# Patient Record
Sex: MALE | Race: WHITE | Employment: FULL TIME | URBAN - METROPOLITAN AREA
[De-identification: names, ages, dates, MRNs, and addresses within clinical notes are randomized per-mention and may not be internally consistent; named-entity substitution may affect disease eponyms.]

---

## 2020-01-23 ENCOUNTER — OFFICE VISIT (OUTPATIENT)
Dept: FAMILY MEDICINE CLINIC | Age: 35
End: 2020-01-23
Payer: COMMERCIAL

## 2020-01-23 VITALS
TEMPERATURE: 98.8 F | BODY MASS INDEX: 33.99 KG/M2 | WEIGHT: 217 LBS | SYSTOLIC BLOOD PRESSURE: 125 MMHG | OXYGEN SATURATION: 98 % | DIASTOLIC BLOOD PRESSURE: 82 MMHG | HEART RATE: 101 BPM

## 2020-01-23 LAB — S PYO AG THROAT QL: NORMAL

## 2020-01-23 PROCEDURE — 99213 OFFICE O/P EST LOW 20 MIN: CPT | Performed by: FAMILY MEDICINE

## 2020-01-23 PROCEDURE — 87880 STREP A ASSAY W/OPTIC: CPT | Performed by: FAMILY MEDICINE

## 2020-01-23 RX ORDER — ALBUTEROL SULFATE 90 UG/1
2 AEROSOL, METERED RESPIRATORY (INHALATION) EVERY 6 HOURS PRN
Qty: 1 INHALER | Refills: 0 | Status: SHIPPED | OUTPATIENT
Start: 2020-01-23

## 2020-01-23 RX ORDER — AZITHROMYCIN 250 MG/1
TABLET, FILM COATED ORAL
Qty: 1 PACKET | Refills: 0 | Status: SHIPPED | OUTPATIENT
Start: 2020-01-23 | End: 2020-02-02

## 2020-01-23 RX ORDER — BENZONATATE 100 MG/1
100 CAPSULE ORAL 3 TIMES DAILY PRN
Qty: 25 CAPSULE | Refills: 0 | Status: SHIPPED | OUTPATIENT
Start: 2020-01-23 | End: 2020-02-17

## 2020-01-23 RX ORDER — PREDNISONE 20 MG/1
20 TABLET ORAL 2 TIMES DAILY
Qty: 10 TABLET | Refills: 0 | Status: SHIPPED | OUTPATIENT
Start: 2020-01-23 | End: 2020-01-28

## 2020-01-23 ASSESSMENT — ENCOUNTER SYMPTOMS
CHEST TIGHTNESS: 1
DIARRHEA: 0
COUGH: 1
SHORTNESS OF BREATH: 0
SORE THROAT: 1
VOMITING: 0
EYE REDNESS: 0
RHINORRHEA: 1
PHOTOPHOBIA: 1
EYE PAIN: 0
WHEEZING: 1
EYE DISCHARGE: 0
SINUS PRESSURE: 1
BACK PAIN: 0
NAUSEA: 0
HEMOPTYSIS: 0
SINUS PAIN: 1
ABDOMINAL PAIN: 1

## 2020-01-23 NOTE — LETTER
[facility]   Emmalena Clayde Alina Jorge  Phone: [phone]  Fax: [phone]    Saravanan Sr MD        January 23, 2020     Patient: Peña Escalante   YOB: 1985   Date of Visit: 1/23/2020       To Whom it May Concern: Gabbi Foss was seen in my clinic on 1/23/2020. He should be excused 1/21/20 and 1/23/20. If you have any questions or concerns, please don't hesitate to call.     Sincerely,         Saravanan Sr MD

## 2020-01-23 NOTE — PATIENT INSTRUCTIONS
Patient Education        Bronchitis: Care Instructions  Your Care Instructions    Bronchitis is inflammation of the bronchial tubes, which carry air to the lungs. The tubes swell and produce mucus, or phlegm. The mucus and inflamed bronchial tubes make you cough. You may have trouble breathing. Most cases of bronchitis are caused by viruses like those that cause colds. Antibiotics usually do not help and they may be harmful. Bronchitis usually develops rapidly and lasts about 2 to 3 weeks in otherwise healthy people. Follow-up care is a key part of your treatment and safety. Be sure to make and go to all appointments, and call your doctor if you are having problems. It's also a good idea to know your test results and keep a list of the medicines you take. How can you care for yourself at home? · Take all medicines exactly as prescribed. Call your doctor if you think you are having a problem with your medicine. · Get some extra rest.  · Take an over-the-counter pain medicine, such as acetaminophen (Tylenol), ibuprofen (Advil, Motrin), or naproxen (Aleve) to reduce fever and relieve body aches. Read and follow all instructions on the label. · Do not take two or more pain medicines at the same time unless the doctor told you to. Many pain medicines have acetaminophen, which is Tylenol. Too much acetaminophen (Tylenol) can be harmful. · Take an over-the-counter cough medicine that contains dextromethorphan to help quiet a dry, hacking cough so that you can sleep. Avoid cough medicines that have more than one active ingredient. Read and follow all instructions on the label. · Breathe moist air from a humidifier, hot shower, or sink filled with hot water. The heat and moisture will thin mucus so you can cough it out. · Do not smoke. Smoking can make bronchitis worse. If you need help quitting, talk to your doctor about stop-smoking programs and medicines.  These can increase your chances of quitting for know your test results and keep a list of the medicines you take. How can you care for yourself at home? · You can buy premixed saline solution in a squeeze bottle or other sinus rinse products at a drugstore. Read and follow the instructions on the label. · You also can make your own saline solution by adding 1 teaspoon of salt and 1 teaspoon of baking soda to 2 cups of distilled water. · If you use a homemade solution, pour a small amount into a clean bowl. Using a rubber bulb syringe, squeeze the syringe and place the tip in the salt water. Pull a small amount of the salt water into the syringe by relaxing your hand. · Sit down with your head tilted slightly back. Do not lie down. Put the tip of the bulb syringe or the squeeze bottle a little way into one of your nostrils. Gently drip or squirt a few drops into the nostril. Repeat with the other nostril. Some sneezing and gagging are normal at first.  · Gently blow your nose. · Wipe the syringe or bottle tip clean after each use. · Repeat this 2 or 3 times a day. · Use nasal washes gently if you have nosebleeds often. When should you call for help? Watch closely for changes in your health, and be sure to contact your doctor if:    · You often get nosebleeds.     · You have problems doing the nasal washes. Where can you learn more? Go to https://[employer]. org and sign in to your [employer] account. Enter [phone] in the [facility] box to learn more about \"Saline Nasal Washes: Care Instructions. \"     If you do not have an account, please click on the \"Sign Up Now\" link. Current as of: July 28, 2019  Content Version: 12.3  © 0496-4724 Healthwise, Incorporated. Care instructions adapted under license by [facility] [employer] [facility] ([facility]). If you have questions about a medical condition or this instruction, always ask your healthcare professional. [address]  any warranty or liability for your use of this information. Patient Education        Upper Respiratory Infection (Cold): Care Instructions  Your Care Instructions    An upper respiratory infection, or URI, is an infection of the nose, sinuses, or throat. URIs are spread by coughs, sneezes, and direct contact. The common cold is the most frequent kind of URI. The flu and sinus infections are other kinds of URIs. Almost all URIs are caused by viruses. Antibiotics won't cure them. But you can treat most infections with home care. This may include drinking lots of fluids and taking over-the-counter pain medicine. You will probably feel better in 4 to 10 days. The doctor has checked you carefully, but problems can develop later. If you notice any problems or new symptoms, get medical treatment right away. Follow-up care is a key part of your treatment and safety. Be sure to make and go to all appointments, and call your doctor if you are having problems. It's also a good idea to know your test results and keep a list of the medicines you take. How can you care for yourself at home? · To prevent dehydration, drink plenty of fluids, enough so that your urine is light yellow or clear like water. Choose water and other caffeine-free clear liquids until you feel better. If you have kidney, heart, or liver disease and have to limit fluids, talk with your doctor before you increase the amount of fluids you drink. · Take an over-the-counter pain medicine, such as acetaminophen (Tylenol), ibuprofen (Advil, Motrin), or naproxen (Aleve). Read and follow all instructions on the label. · Before you use cough and cold medicines, check the label. These medicines may not be safe for young children or for people with certain health problems. · Be careful when taking over-the-counter cold or flu medicines and Tylenol at the same time. Many of these medicines have acetaminophen, which is Tylenol. Read the labels to make sure that you are not taking more than the recommended dose.  Too

## 2020-01-23 NOTE — PROGRESS NOTES
[place] [address] [facility]   [address] [address]  Dept: [phone]  Dept Fax: [phone]    Nakul Woodard is a 29 y.o. male who presents today for his medicalconditions/complaints as noted below. Nakul Woodard is c/o of Cough (deep cough x 4 days; starting to become productive cough; sore throat; OTC mucinex )        HPI:      Cough   This is a new problem. The current episode started in the past 7 days (4 days). The problem has been unchanged. The cough is productive of sputum. Associated symptoms include chills, ear congestion, ear pain, headaches, nasal congestion, postnasal drip, rhinorrhea, a sore throat and wheezing. Pertinent negatives include no chest pain, eye redness, fever, hemoptysis, myalgias, rash, shortness of breath or sweats. The symptoms are aggravated by cold air and lying down. He has tried OTC cough suppressant (ibuprofen) for the symptoms. The treatment provided mild relief. His past medical history is significant for asthma, bronchitis, environmental allergies and pneumonia. History reviewed. No pertinent past medical history. History reviewed. No pertinent surgical history.     Family History   Problem Relation Age of Onset    High Cholesterol Mother     High Cholesterol Father     Kidney Disease Maternal Grandmother     Diabetes Maternal Grandfather        Social History     Tobacco Use    Smoking status: Never Smoker    Smokeless tobacco: Never Used   Substance Use Topics    Alcohol use: Yes     Comment: occasionally      Current Outpatient Medications   Medication Sig Dispense Refill    benzonatate (TESSALON PERLES) 100 MG capsule Take 1 capsule by mouth 3 times daily as needed for Cough 25 capsule 0    predniSONE (DELTASONE) 20 MG tablet Take 1 tablet by mouth 2 times daily for 5 days Take one tablet twice a day with food for 5 days 10 tablet 0    albuterol sulfate  (90 Base) MCG/ACT mucosal edema, congestion and rhinorrhea present. Mouth/Throat:      Pharynx: Oropharyngeal exudate and posterior oropharyngeal erythema present. No pharyngeal swelling. Tonsils: No tonsillar exudate. Eyes:      General:         Right eye: No discharge. Left eye: No discharge. Conjunctiva/sclera: Conjunctivae normal.      Pupils: Pupils are equal, round, and reactive to light. Neck:      Musculoskeletal: Normal range of motion and neck supple. Cardiovascular:      Rate and Rhythm: Normal rate and regular rhythm. Heart sounds: Normal heart sounds. No murmur. Pulmonary:      Effort: Pulmonary effort is normal. No respiratory distress. Breath sounds: Wheezing present. No rales. Abdominal:      General: Bowel sounds are normal. There is no distension. Palpations: Abdomen is soft. There is no mass. Tenderness: There is no tenderness. Musculoskeletal: Normal range of motion. General: No tenderness. Lymphadenopathy:      Cervical: Cervical adenopathy present. Skin:     General: Skin is warm. Findings: No rash. Neurological:      Mental Status: He is alert and oriented to person, place, and time. Cranial Nerves: No cranial nerve deficit. Coordination: Coordination normal.   Psychiatric:         Behavior: Behavior normal.         Thought Content: Thought content normal.         Judgment: Judgment normal.       /82 (Site: Left Upper Arm, Position: Sitting, Cuff Size: Large Adult)   Pulse 101   Temp 98.8 °F (37.1 °C) (Oral)   Wt 217 lb (98.4 kg)   SpO2 98%   BMI 33.99 kg/m²     Assessment:       Diagnosis Orders   1. Acute bronchitis, unspecified organism  POCT rapid strep A    benzonatate (TESSALON PERLES) 100 MG capsule    predniSONE (DELTASONE) 20 MG tablet    albuterol sulfate  (90 Base) MCG/ACT inhaler    azithromycin (ZITHROMAX) 250 MG tablet   2.  Acute URI  POCT rapid strep A       Plan:      POCT: Strep test

## 2020-01-23 NOTE — LETTER
[facility]   [facility]  Phone: [phone]  Fax: [phone]    Margie Mancilla MD        January 23, 2020     Patient: Everett Escalante   YOB: 1985   Date of Visit: 1/23/2020       To Whom it May Concern: Karan Rodriguez was seen in my clinic on 1/23/2020. He should be excused from work 1/22/20 and 1/23/20. If you have any questions or concerns, please don't hesitate to call.     Sincerely,         Margie Mancilla MD

## 2020-02-10 ENCOUNTER — OFFICE VISIT (OUTPATIENT)
Dept: ORTHOPEDIC SURGERY | Age: 35
End: 2020-02-10
Payer: COMMERCIAL

## 2020-02-10 VITALS
HEART RATE: 86 BPM | BODY MASS INDEX: 32.58 KG/M2 | SYSTOLIC BLOOD PRESSURE: 138 MMHG | HEIGHT: 68 IN | DIASTOLIC BLOOD PRESSURE: 85 MMHG | WEIGHT: 215 LBS

## 2020-02-10 PROCEDURE — 99204 OFFICE O/P NEW MOD 45 MIN: CPT | Performed by: ORTHOPAEDIC SURGERY

## 2020-02-10 NOTE — PROGRESS NOTES
Disp: 25 capsule, Rfl: 0    albuterol sulfate  (90 Base) MCG/ACT inhaler, Inhale 2 puffs into the lungs every 6 hours as needed for Wheezing, Disp: 1 Inhaler, Rfl: 0    fluticasone (FLONASE) 50 MCG/ACT nasal spray, 2 sprays by Nasal route daily (Patient not taking: Reported on 1/23/2020), Disp: 1 Bottle, Rfl: 2     Allergies:    Patient has no known allergies. Family History:  family history includes Diabetes in his maternal grandfather; High Cholesterol in his father and mother; Kidney Disease in his maternal grandmother. Social History:   Social History     Occupational History    Not on file   Tobacco Use    Smoking status: Never Smoker    Smokeless tobacco: Never Used   Substance and Sexual Activity    Alcohol use: Yes     Comment: occasionally    Drug use: No    Sexual activity: Not on file     Occupation: Works full-time in a [employer]     OBJECTIVE:  /85   Pulse 86   Ht 5' 8\" (1.727 m)   Wt 215 lb (97.5 kg)   BMI 32.69 kg/m²    Psych: alert and oriented to person, time, and place  Cardio:  well perfused extremities  Resp:  normal respiratory effort  Skin:  no cyanosis  Hem/lymph:  no lymphedema  Neuro:  sensation to light touch grossly intact throughout all nerve distributions in the foot   Musculoskeletal:    RLE:  Alignment:  cavovarus, slightly asymmetric to contralateral side  Vascular: Toes warm and well perfused, compartments soft/compressible. No significant swelling of foot. Skin: Intact without rash/lesions/AV malformations.   Strength: Able to fire/perform the following with appropriate strength:    [x]  Tib Ant:     [x]  Gastroc-Soleus:         [x]  Inversion:    [x]  Eversion:         [x]  FHL:     [x]  EHL:      Motion:  Normal for the following joints:    []  Ankle: Decreased     []  Subtalar: Decreased      [x]  1st MTP:        []  1st TMT:            Tenderness to Palpation:    Tenderness to palpation:  anterior ankle joint line greater than sinus Tarsi  -Pain with maximum dorsiflexion  -Gastroc equinus  -Positive talar tilt compared to contralateral side      LLE:  Alignment:  Heel neutral subtle cavus  Vascular: Toes warm and well perfused, compartments soft/compressible. No significant swelling of foot. Skin: Intact without rash/lesions/AV malformations. Strength: Able to fire/perform the following with appropriate strength:    [x]  Tib Ant:     [x]  Gastroc-Soleus:         [x]  Inversion:    [x]  Eversion:         [x]  FHL:     [x]  EHL:      Motion:  Normal for the following joints:    []  Ankle: Decreased    [x]  Subtalar:        [x]  1st MTP:        []  1st TMT:            Tenderness to Palpation:    Tenderness to palpation:  anterior ankle joint line  -Gastroc equinus      RADIOLOGY:   2/10/2020 FINDINGS:  Three weightbearing views (AP, Mortise, and Lateral) of the bilateral ankle and three weightbearing views (AP, Oblique, Lateral) of the bilateral foot were obtained in the office today and reviewed, revealing no acute fracture, dislocation, or radioopaque foreign body/tumor. Degenerative changes of bilateral tibiotalar joints with anterior distal tibia and dorsal talar neck osteophytes, with preserved joint space. Bilaterally there are medial talar osteochondral defects noted. On the right, there is varus tilt of his tibiotalar joint, and only slight varus tilt on the left of the tibiotalar joint. IMPRESSION:  No acute fracture/dislocation. Degenerative changes of tibiotalar joints bilaterally with anterior osteophytes, and medial osteochondral defects as above. Right varus ankle joint greater than on the left. Electronically signed by Jennifer Arrieta MD      ASSESSMENT AND PLAN:  Carlita Galeas was seen today for Ankle Pain (Bilateral Ankle)  The primary encounter diagnosis was Ankle impingement syndrome, unspecified laterality.  Diagnoses of Osteoarthritis of ankle or foot, unspecified laterality, Osteochondral defect of ankle, Cavus deformity of foot, acquired, and Gastrocnemius equinus, unspecified laterality were also pertinent to this visit. Body mass index is 32.69 kg/m². He has right greater than left foot/ankle pain secondary to tibiotalar arthritis with varus tilt of his right ankle along with anterior ankle impingement and a medial talar OCD, with an asymmetric cavovarus foot and underlying gastroc equinus, as well as some lateral ankle instability; on the left he has a neutral heel with a cavus foot, subtle varus tip of his tibiotalar joint. Notably, he does not have any relevant past medical history. I had a long discussion today with the patient about the likely diagnosis and its natural history, physical exam and imaging findings, as well as treatment options in detail. We discussed rest/activity modification, swelling control, NSAIDs/Acetaminophen/topical anesthetics, orthotics/shoewear modification, bracing/immobilization, injections, and physical therapy. Surgically, we discussed 2 main surgical options. The first surgical option would include a tibiotalar debridement/cheilectomy with a gastroc recession. The second surgical option would include a calcaneal osteotomy, first TMT fusion, and a gastroc recession. We also discussed a possible future procedure for his medial talar OCD. The patient wishes to proceed with the recommendations as above. Orders/referrals were placed as below at today's visit. The patient was provided a lace up ankle brace to use when ambulatory for pain control and stabilization. I also referred the patient to physical therapy for my cavus foot protocol. The patient was also provided a referral to prosthetics orthotics to obtain a custom cavus style orthotic, due to his significant cavovarus foot deformity. All questions were answered and the patient agrees with the above plan. The patient will return to clinic in 6 weeks.  At his next visit, I will consider stress xrays, and to discuss surgery

## 2020-02-10 NOTE — LETTER
prosthetics orthotics to obtain a custom cavus style orthotic, due to his significant cavovarus foot deformity. All questions were answered and the patient agrees with the above plan. The patient will return to clinic in 3 months without x-rays, or sooner if he would like to proceed with surgery sooner. At his next visit, we will again discuss his treatment options, and if he would like to proceed with surgery, I will order a CT scan. I look forward to serving you and your patients again in the future. Please don't hesitate to contact me at my mobile number [phone].         Nimco Barros MD  Orthopedic Surgery, Foot and Ankle

## 2020-02-11 ENCOUNTER — TELEPHONE (OUTPATIENT)
Dept: ORTHOPEDIC SURGERY | Age: 35
End: 2020-02-11

## 2021-12-21 ENCOUNTER — OFFICE VISIT (OUTPATIENT)
Dept: PRIMARY CARE CLINIC | Age: 36
End: 2021-12-21
Payer: COMMERCIAL

## 2021-12-21 VITALS
OXYGEN SATURATION: 100 % | HEIGHT: 68 IN | TEMPERATURE: 98.3 F | WEIGHT: 222.4 LBS | HEART RATE: 102 BPM | DIASTOLIC BLOOD PRESSURE: 80 MMHG | SYSTOLIC BLOOD PRESSURE: 126 MMHG | BODY MASS INDEX: 33.71 KG/M2

## 2021-12-21 DIAGNOSIS — J39.2 DRY THROAT: ICD-10-CM

## 2021-12-21 DIAGNOSIS — J04.0 LARYNGITIS: ICD-10-CM

## 2021-12-21 DIAGNOSIS — J39.2 THROAT IRRITATION: Primary | ICD-10-CM

## 2021-12-21 PROCEDURE — 99213 OFFICE O/P EST LOW 20 MIN: CPT | Performed by: FAMILY MEDICINE

## 2021-12-21 RX ORDER — PANTOPRAZOLE SODIUM 40 MG/1
40 TABLET, DELAYED RELEASE ORAL
Qty: 30 TABLET | Refills: 1 | Status: SHIPPED | OUTPATIENT
Start: 2021-12-21

## 2021-12-21 SDOH — ECONOMIC STABILITY: FOOD INSECURITY: WITHIN THE PAST 12 MONTHS, THE FOOD YOU BOUGHT JUST DIDN'T LAST AND YOU DIDN'T HAVE MONEY TO GET MORE.: NEVER TRUE

## 2021-12-21 SDOH — ECONOMIC STABILITY: FOOD INSECURITY: WITHIN THE PAST 12 MONTHS, YOU WORRIED THAT YOUR FOOD WOULD RUN OUT BEFORE YOU GOT MONEY TO BUY MORE.: NEVER TRUE

## 2021-12-21 ASSESSMENT — PATIENT HEALTH QUESTIONNAIRE - PHQ9
2. FEELING DOWN, DEPRESSED OR HOPELESS: 0
SUM OF ALL RESPONSES TO PHQ QUESTIONS 1-9: 0
1. LITTLE INTEREST OR PLEASURE IN DOING THINGS: 0
SUM OF ALL RESPONSES TO PHQ QUESTIONS 1-9: 0
SUM OF ALL RESPONSES TO PHQ QUESTIONS 1-9: 0
SUM OF ALL RESPONSES TO PHQ9 QUESTIONS 1 & 2: 0

## 2021-12-21 ASSESSMENT — SOCIAL DETERMINANTS OF HEALTH (SDOH): HOW HARD IS IT FOR YOU TO PAY FOR THE VERY BASICS LIKE FOOD, HOUSING, MEDICAL CARE, AND HEATING?: NOT HARD AT ALL

## 2021-12-21 NOTE — PROGRESS NOTES
[address] [place] [address] PRIMARY CARE  [address] [place] [address] B  [address] [address]  Dept: [phone]    Scottie Sierra is a 39 y.o. male Established patient, who presents today for his medical conditions/complaintsas noted below. Chief Complaint   Patient presents with    Mass     Pt states there is a lump in his chin and neck area that causes some discomfort in his throat and around the lump. x2-3 months    Other     Pt declined flu shot       HPI:     HPI    Had COVID in October, feels ok now. Lost taste and smell during covid. Noticed a lump in October under the right chin, stil feels it. No dysphagia. Does have Dry throat and mouth. Feels like something is in the throat, clearing throat doesn't help. Deep itch in the larynx area. Throat feels irritated and uncomfortable. Worse with cold air. At evening and HS his voice is deep and mild laryngitis every night.   eating rougher veggie makes it feel like it gets caught. No tobacco: chewing or smoking. Drinking water. Caffeine: coffee in am, sometimes energy drink    Reviewed prior notes None  Reviewed previous none    LDL Cholesterol (mg/dL)   Date Value   10/15/2016 156 (H)       (goal LDL is <100)   No results found for: AST, ALT, BUN, LABA1C, TSH  BP Readings from Last 3 Encounters:   12/21/21 126/80   02/10/20 138/85   01/23/20 125/82          (goal 120/80)    History reviewed. No pertinent past medical history. History reviewed. No pertinent surgical history.     Family History   Problem Relation Age of Onset    High Cholesterol Mother     High Cholesterol Father     Kidney Disease Maternal Grandmother     Diabetes Maternal Grandfather        Social History     Tobacco Use    Smoking status: Never Smoker    Smokeless tobacco: Never Used   Substance Use Topics    Alcohol use: Yes     Comment: occasionally      Current Outpatient Medications   Medication Sig Dispense Refill    pantoprazole (PROTONIX) 40 MG tablet Take 1 tablet by mouth every morning (before breakfast) 30 tablet 1    albuterol sulfate  (90 Base) MCG/ACT inhaler Inhale 2 puffs into the lungs every 6 hours as needed for Wheezing (Patient not taking: Reported on 12/21/2021) 1 Inhaler 0    fluticasone (FLONASE) 50 MCG/ACT nasal spray 2 sprays by Nasal route daily (Patient not taking: Reported on 1/23/2020) 1 Bottle 2     No current facility-administered medications for this visit. No Known Allergies    Health Maintenance   Topic Date Due    Hepatitis C screen  Never done    Varicella vaccine (1 of 2 - 2-dose childhood series) Never done    COVID-19 Vaccine (1) Never done    HIV screen  Never done    DTaP/Tdap/Td vaccine (1 - Tdap) Never done    Diabetes screen  Never done    Flu vaccine (1) Never done    Hepatitis A vaccine  Aged Out    Hepatitis B vaccine  Aged Out    Hib vaccine  Aged Out    Meningococcal (ACWY) vaccine  Aged Out    Pneumococcal 0-64 years Vaccine  Aged Out       Subjective:      Review of Systems    Objective:     /80   Pulse 102   Temp 98.3 °F (36.8 °C)   Ht 5' 8\" (1.727 m)   Wt 222 lb 6.4 oz (100.9 kg)   SpO2 100%   BMI 33.82 kg/m²   Physical Exam  Vitals and nursing note reviewed. Constitutional:       General: He is not in acute distress. Appearance: He is well-developed. He is not ill-appearing. HENT:      Head: Normocephalic and atraumatic. Right Ear: Tympanic membrane, ear canal and external ear normal.      Left Ear: Tympanic membrane and external ear normal.      Mouth/Throat:      Mouth: Mucous membranes are dry. Pharynx: No oropharyngeal exudate or posterior oropharyngeal erythema. Comments: Mouth exam done, no masses palpated. Submental area is normal.  Parotids are normal.  Eyes:      General: No scleral icterus. Right eye: No discharge. Left eye: No discharge.       Conjunctiva/sclera: Conjunctivae normal.      Pupils: Pupils are equal, round, and reactive to light. Neck:      Thyroid: No thyromegaly. Trachea: No tracheal deviation. Cardiovascular:      Rate and Rhythm: Normal rate and regular rhythm. Heart sounds: Normal heart sounds. Pulmonary:      Effort: Pulmonary effort is normal. No respiratory distress. Breath sounds: Normal breath sounds. No wheezing. Musculoskeletal:      Cervical back: No rigidity. Lymphadenopathy:      Cervical: No cervical adenopathy. Skin:     General: Skin is warm. Findings: No rash. Neurological:      Mental Status: He is alert and oriented to person, place, and time. Psychiatric:         Mood and Affect: Mood normal.         Behavior: Behavior normal.         Thought Content: Thought content normal.         Assessment:       Diagnosis Orders   1. Throat irritation  pantoprazole (PROTONIX) 40 MG tablet   2. Dry throat     3. Laryngitis        Possible GERD  Plan:      No follow-ups on file. Sour candy or lemon drops or water 2-3 x a day  Try PPI for a month and if helping decrease dose to 20 mg   if dryness not better: check SD  If other throat sx not better see ENT  No orders of the defined types were placed in this encounter. Orders Placed This Encounter   Medications    pantoprazole (PROTONIX) 40 MG tablet     Sig: Take 1 tablet by mouth every morning (before breakfast)     Dispense:  30 tablet     Refill:  1       Patient given educationalmaterials - see patient instructions. Discussed use, benefit, and side effectsof prescribed medications. All patient questions answered. Pt voiced understanding. Reviewed health maintenance. Instructed to continue current medications, diet andexercise. Patient agreed with treatment plan. Follow up as directed.      Electronicallysigned by Van Chou MD on 12/21/2021 at 5:11 PM

## 2021-12-21 NOTE — PATIENT INSTRUCTIONS
Patient Education        Dry Mouth: Care Instructions  Overview     When you have dry mouth, or xerostomia (say \"zee-ruh-STO-kit-uh\"), your mouth does not make enough saliva. Saliva helps you chew, swallow, and digest your food. It also neutralizes the acids that form in your mouth. If you have ongoing dry mouth, it can lead to mouth infections, gum disease, and tooth decay. It can also make it hard to swallow or talk. Your doctor or dentist may diagnose dry mouth. It is often a side effect of medicines like diuretics, decongestants, and antidepressants. Cancer treatments or damage to the salivary glands can also cause dry mouth. To help fight the effects of dry mouth, your dentist may apply extra fluoride to your teeth. This can help prevent tooth decay. You may also be given mouthwash to fight bacteria. You may need to have dental checkups more often. Treatment may include medicine to help you make more saliva. Or, if other medicines you take are making your mouth dry, your doctor may change the type or dosage of the medicine. Follow-up care is a key part of your treatment and safety. Be sure to make and go to all appointments, and call your doctor if you are having problems. It's also a good idea to know your test results and keep a list of the medicines you take. How can you care for yourself at home? · Take frequent sips of liquid throughout the day. Water is best.  · Use ice chips, sugar-free candy, or gum to help keep your mouth moist. (Candy or gum sweetened with xylitol can help prevent tooth decay.)  · Avoid spicy or salty foods. They may cause pain in a dry mouth. · Brush your teeth twice a day, morning and night. Floss once a day. · Schedule checkups and cleanings as often as your dentist recommends it. · Use an over-the-counter saliva substitute. · Avoid caffeine, tobacco, and alcohol. They can also make your mouth dry. · You may be given medicine for dry mouth. Be safe with medicines.  Take your medicines exactly as prescribed. Call your doctor if you think you are having a problem with your medicine. When should you call for help? Watch closely for changes in your health, and be sure to contact your doctor if:    · You do not get better as expected. Where can you learn more? Go to https://chpepiceweb.FloTime. org and sign in to your [employer] account. Enter [phone] in the [employer] box to learn more about \"Dry Mouth: Care Instructions. \"     If you do not have an account, please click on the \"Sign Up Now\" link. Current as of: June 30, 2021               Content Version: 13.0  © 2006-2021 [employer]. Care instructions adapted under license by [facility] ([facility]). If you have questions about a medical condition or this instruction, always ask your healthcare professional. [place] [address] any warranty or liability for your use of this information. Patient Education        Gastroesophageal Reflux Disease (GERD): Care Instructions  Overview     Gastroesophageal reflux disease (GERD) is the backward flow of stomach acid into the esophagus. The esophagus is the tube that leads from your throat to your stomach. A one-way valve prevents the stomach acid from backing up into this tube. But when you have GERD, this valve does not close tightly enough. This can also cause pain and swelling in your esophagus. (This is called esophagitis.)  If you have mild GERD symptoms including heartburn, you may be able to control the problem with antacids or over-the-counter medicine. You can also make lifestyle changes to help reduce your symptoms. These include changing your diet and eating habits, such as not eating late at night and losing weight. Follow-up care is a key part of your treatment and safety. Be sure to make and go to all appointments, and call your doctor if you are having problems.  It's also a good idea to know your test results and keep a list of the medicines you take. How can you care for yourself at home? · Take your medicines exactly as prescribed. Call your doctor if you think you are having a problem with your medicine. · Your doctor may recommend over-the-counter medicine. For mild or occasional indigestion, antacids, such as Tums, Gaviscon, Mylanta, or Maalox, may help. Your doctor also may recommend over-the-counter acid reducers, such as famotidine (Pepcid AC), cimetidine (Tagamet HB), or omeprazole (Prilosec). Read and follow all instructions on the label. If you use these medicines often, talk with your doctor. · Change your eating habits. ? It's best to eat several small meals instead of two or three large meals. ? After you eat, wait 2 to 3 hours before you lie down. ? Chocolate, mint, and alcohol can make GERD worse. ? Spicy foods, foods that have a lot of acid (like tomatoes and oranges), and coffee can make GERD symptoms worse in some people. If your symptoms are worse after you eat a certain food, you may want to stop eating that food to see if your symptoms get better. · Do not smoke or chew tobacco. Smoking can make GERD worse. If you need help quitting, talk to your doctor about stop-smoking programs and medicines. These can increase your chances of quitting for good. · If you have GERD symptoms at night, raise the head of your bed 6 to 8 inches by putting the frame on blocks or placing a foam wedge under the head of your mattress. (Adding extra pillows does not work.)  · Do not wear tight clothing around your middle. · Lose weight if you need to. Losing just 5 to 10 pounds can help. When should you call for help? Call your doctor now or seek immediate medical care if:    · You have new or different belly pain.     · Your stools are black and tarlike or have streaks of blood.    Watch closely for changes in your health, and be sure to contact your doctor if:    · Your symptoms have not improved after 2 days.     · Food seems to catch in your throat or chest.   Where can you learn more? Go to https://chpepiceweb.Properati. org and sign in to your GoSpotCheck account. Enter O312 in the [employer] box to learn more about \"Gastroesophageal Reflux Disease (GERD): Care Instructions. \"     If you do not have an account, please click on the \"Sign Up Now\" link. Current as of: February 10, 2021               Content Version: 13.0  © 0712-2468 [employer]. Care instructions adapted under license by [facility] ([facility]). If you have questions about a medical condition or this instruction, always ask your healthcare professional. [address] any warranty or liability for your use of this information.